# Patient Record
Sex: FEMALE | Race: WHITE | NOT HISPANIC OR LATINO | Employment: UNEMPLOYED | URBAN - METROPOLITAN AREA
[De-identification: names, ages, dates, MRNs, and addresses within clinical notes are randomized per-mention and may not be internally consistent; named-entity substitution may affect disease eponyms.]

---

## 2023-03-21 ENCOUNTER — TELEPHONE (OUTPATIENT)
Dept: PRIMARY CARE | Facility: CLINIC | Age: 58
End: 2023-03-21
Payer: OTHER GOVERNMENT

## 2023-03-21 DIAGNOSIS — I10 HYPERTENSION, UNSPECIFIED TYPE: ICD-10-CM

## 2023-03-21 DIAGNOSIS — N39.0 RECURRENT UTI: Primary | ICD-10-CM

## 2023-03-21 RX ORDER — CONJUGATED ESTROGENS 0.62 MG/G
CREAM VAGINAL
COMMUNITY
Start: 2020-09-18 | End: 2023-03-22 | Stop reason: SDUPTHER

## 2023-03-21 RX ORDER — NIFEDIPINE 30 MG/1
1 TABLET, FILM COATED, EXTENDED RELEASE ORAL DAILY
COMMUNITY
Start: 2020-09-18 | End: 2023-03-22 | Stop reason: SDUPTHER

## 2023-03-21 RX ORDER — ESTRADIOL 1 MG/1
1 TABLET ORAL DAILY
COMMUNITY
Start: 2020-09-18 | End: 2023-03-22 | Stop reason: SDUPTHER

## 2023-03-21 NOTE — TELEPHONE ENCOUNTER
INITIAL ASSESSMENT:
Pt evaluated for d/c planning needs.  Reviewed chart and spoke with nurse and
pt.  Pt is alert and oriented.  Pt lives in house with spouse and was
independent with ADL's prior to admission.  Pt is an [employer]
.  He said he informed his employer that he is hospitalized.  Pt has
CPAP, and uses no other DME.  Pt has not had home health in the past.  Pt said
he is an independent  and does not have insurance.  Referral made to
[facility].  No other needs identified. Patient would like to switch from metformin to something else. She's been getting an upset stomach with it.    She also wants a refill on Nifedipine 30mg  estradiol 1mg and premarin 0.625mg[facility]

## 2023-03-22 DIAGNOSIS — J30.9 ALLERGIC RHINITIS, UNSPECIFIED SEASONALITY, UNSPECIFIED TRIGGER: Primary | ICD-10-CM

## 2023-03-22 PROBLEM — E66.9 DIABETES MELLITUS TYPE 2 IN OBESE: Status: ACTIVE | Noted: 2023-03-22

## 2023-03-22 PROBLEM — N39.0 RECURRENT UTI: Status: ACTIVE | Noted: 2023-03-22

## 2023-03-22 PROBLEM — I10 HTN (HYPERTENSION): Status: ACTIVE | Noted: 2023-03-22

## 2023-03-22 PROBLEM — E11.69 DIABETES MELLITUS TYPE 2 IN OBESE: Status: ACTIVE | Noted: 2023-03-22

## 2023-03-22 PROBLEM — R30.0 DYSURIA: Status: ACTIVE | Noted: 2023-03-22

## 2023-03-22 RX ORDER — ESTRADIOL 1 MG/1
1 TABLET ORAL DAILY
Qty: 90 TABLET | Refills: 1 | Status: SHIPPED | OUTPATIENT
Start: 2023-03-22 | End: 2023-10-06

## 2023-03-22 RX ORDER — CONJUGATED ESTROGENS 0.62 MG/G
CREAM VAGINAL
Qty: 30 G | Refills: 1 | Status: SHIPPED | OUTPATIENT
Start: 2023-03-22

## 2023-03-22 RX ORDER — CETIRIZINE HYDROCHLORIDE 10 MG/1
TABLET ORAL
Qty: 90 TABLET | Refills: 3 | Status: SHIPPED | OUTPATIENT
Start: 2023-03-22 | End: 2024-04-03

## 2023-03-22 RX ORDER — NIFEDIPINE 30 MG/1
30 TABLET, FILM COATED, EXTENDED RELEASE ORAL DAILY
Qty: 90 TABLET | Refills: 1 | Status: SHIPPED | OUTPATIENT
Start: 2023-03-22 | End: 2023-10-06

## 2023-03-22 RX ORDER — FLUTICASONE PROPIONATE 50 MCG
SPRAY, SUSPENSION (ML) NASAL
Qty: 16 G | Refills: 11 | Status: SHIPPED | OUTPATIENT
Start: 2023-03-22

## 2023-03-24 ENCOUNTER — TELEPHONE (OUTPATIENT)
Dept: PRIMARY CARE | Facility: CLINIC | Age: 58
End: 2023-03-24
Payer: OTHER GOVERNMENT

## 2023-03-24 DIAGNOSIS — I10 HYPERTENSION, UNSPECIFIED TYPE: Primary | ICD-10-CM

## 2023-03-24 RX ORDER — TRIAMTERENE AND HYDROCHLOROTHIAZIDE 75; 50 MG/1; MG/1
1 TABLET ORAL DAILY
COMMUNITY
Start: 2020-09-18 | End: 2023-03-26 | Stop reason: SDUPTHER

## 2023-03-26 RX ORDER — TRIAMTERENE AND HYDROCHLOROTHIAZIDE 75; 50 MG/1; MG/1
1 TABLET ORAL DAILY
Qty: 90 TABLET | Refills: 3 | Status: SHIPPED | OUTPATIENT
Start: 2023-03-26 | End: 2024-04-03

## 2023-04-02 PROBLEM — K21.9 CHRONIC GERD: Status: ACTIVE | Noted: 2023-04-02

## 2023-04-02 PROBLEM — R35.0 URINARY FREQUENCY: Status: ACTIVE | Noted: 2023-04-02

## 2023-04-02 PROBLEM — E78.2 ELEVATED CHOLESTEROL WITH ELEVATED TRIGLYCERIDES: Status: ACTIVE | Noted: 2023-04-02

## 2023-04-02 PROBLEM — E66.01 CLASS 2 SEVERE OBESITY WITH SERIOUS COMORBIDITY AND BODY MASS INDEX (BMI) OF 36.0 TO 36.9 IN ADULT (MULTI): Status: ACTIVE | Noted: 2023-04-02

## 2023-04-02 PROBLEM — Z78.0 POSTMENOPAUSAL: Status: ACTIVE | Noted: 2023-04-02

## 2023-04-02 PROBLEM — Z88.9 HISTORY OF SEASONAL ALLERGIES: Status: ACTIVE | Noted: 2023-04-02

## 2023-04-02 PROBLEM — E66.812 CLASS 2 SEVERE OBESITY WITH SERIOUS COMORBIDITY AND BODY MASS INDEX (BMI) OF 36.0 TO 36.9 IN ADULT: Status: ACTIVE | Noted: 2023-04-02

## 2023-04-02 PROBLEM — B37.31 YEAST VAGINITIS: Status: ACTIVE | Noted: 2023-04-02

## 2023-04-02 PROBLEM — H10.13 ALLERGIC CONJUNCTIVITIS OF BOTH EYES: Status: ACTIVE | Noted: 2023-04-02

## 2023-04-02 RX ORDER — METFORMIN HYDROCHLORIDE 500 MG/1
1 TABLET, EXTENDED RELEASE ORAL 2 TIMES DAILY
COMMUNITY
Start: 2021-11-03 | End: 2023-04-03 | Stop reason: SINTOL

## 2023-04-02 RX ORDER — SULFACETAMIDE SODIUM, SULFUR 100; 50 MG/G; MG/G
EMULSION TOPICAL
COMMUNITY

## 2023-04-02 RX ORDER — OMEPRAZOLE 40 MG/1
40 CAPSULE, DELAYED RELEASE ORAL DAILY
COMMUNITY
Start: 2023-03-03

## 2023-04-02 RX ORDER — TRETINOIN 0.5 MG/G
CREAM TOPICAL
COMMUNITY

## 2023-04-03 ENCOUNTER — TELEMEDICINE (OUTPATIENT)
Dept: PRIMARY CARE | Facility: CLINIC | Age: 58
End: 2023-04-03
Payer: OTHER GOVERNMENT

## 2023-04-03 DIAGNOSIS — E66.9 DIABETES MELLITUS TYPE 2 IN OBESE: Primary | ICD-10-CM

## 2023-04-03 DIAGNOSIS — E11.69 DIABETES MELLITUS TYPE 2 IN OBESE: Primary | ICD-10-CM

## 2023-04-03 PROCEDURE — 99214 OFFICE O/P EST MOD 30 MIN: CPT | Performed by: FAMILY MEDICINE

## 2023-04-03 RX ORDER — GLIMEPIRIDE 1 MG/1
1 TABLET ORAL
Qty: 90 TABLET | Refills: 0 | Status: SHIPPED | OUTPATIENT
Start: 2023-04-03 | End: 2023-08-09 | Stop reason: SINTOL

## 2023-04-03 NOTE — PROGRESS NOTES
Subjective   Patient ID: Candie Toure is a 57 y.o. female who presents for Diabetes.    Diabetes       Wondering about changing medications for diabetes.  She states she noticed on days that she forgot metformin she felt a lot better overall  She decided to stop her metformin about 2 weeks ago and has had much less digestive upset.  She has been working with a GI group in [place] for her GERD and feels things are well controlled there  She has not been on any mood other diabetic medications except for metformin  Last A1c 6.0-2/23    No other concerns  Review of Systems   All other systems reviewed and are negative.      Objective   There were no vitals taken for this visit.    Physical Exam  Constitutional:       Appearance: Normal appearance.   Pulmonary:      Effort: No respiratory distress.   Neurological:      General: No focal deficit present.      Mental Status: She is alert.   Psychiatric:         Mood and Affect: Mood normal.         Behavior: Behavior normal.         Thought Content: Thought content normal.         Assessment/Plan   Problem List Items Addressed This Visit          Endocrine/Metabolic    Diabetes mellitus type 2 in obese (CMS/HCC) - Primary    Relevant Medications    glimepiride (Amaryl) 1 mg tablet   We will stop metformin try glimepiride  Risks benefits and side effects discussed.  We discussed checking blood sugar at home if having any hypoglycemic episodes  We will keep next scheduled follow-up in 4 months with labs unless concerns sooner    Patient verbalized understanding of plan of care and all questions were answered.       Skip Mckeon, DO

## 2023-07-20 ENCOUNTER — TELEPHONE (OUTPATIENT)
Dept: PRIMARY CARE | Facility: CLINIC | Age: 58
End: 2023-07-20
Payer: OTHER GOVERNMENT

## 2023-07-20 NOTE — TELEPHONE ENCOUNTER
PT states she has poison ivy all over her legs that is creeping up, seems to be getting worse than better.  Asking if she can get a RX sent to [facility] in [place]

## 2023-08-09 ENCOUNTER — OFFICE VISIT (OUTPATIENT)
Dept: PRIMARY CARE | Facility: CLINIC | Age: 58
End: 2023-08-09
Payer: OTHER GOVERNMENT

## 2023-08-09 VITALS
SYSTOLIC BLOOD PRESSURE: 140 MMHG | DIASTOLIC BLOOD PRESSURE: 70 MMHG | TEMPERATURE: 98.3 F | BODY MASS INDEX: 36.72 KG/M2 | WEIGHT: 210.6 LBS

## 2023-08-09 DIAGNOSIS — E11.69 DIABETES MELLITUS TYPE 2 IN OBESE: ICD-10-CM

## 2023-08-09 DIAGNOSIS — M54.42 CHRONIC LEFT-SIDED LOW BACK PAIN WITH LEFT-SIDED SCIATICA: ICD-10-CM

## 2023-08-09 DIAGNOSIS — G89.29 CHRONIC LEFT-SIDED LOW BACK PAIN WITH LEFT-SIDED SCIATICA: ICD-10-CM

## 2023-08-09 DIAGNOSIS — E66.9 DIABETES MELLITUS TYPE 2 IN OBESE: ICD-10-CM

## 2023-08-09 DIAGNOSIS — N39.0 RECURRENT UTI: Primary | ICD-10-CM

## 2023-08-09 DIAGNOSIS — M72.2 PLANTAR FASCIITIS: ICD-10-CM

## 2023-08-09 LAB
POC APPEARANCE, URINE: CLEAR
POC BILIRUBIN, URINE: NEGATIVE
POC BLOOD, URINE: NEGATIVE
POC COLOR, URINE: YELLOW
POC GLUCOSE, URINE: NEGATIVE MG/DL
POC KETONES, URINE: NEGATIVE MG/DL
POC LEUKOCYTES, URINE: ABNORMAL
POC NITRITE,URINE: NEGATIVE
POC PH, URINE: 5 PH
POC PROTEIN, URINE: NEGATIVE MG/DL
POC SPECIFIC GRAVITY, URINE: <=1.005
POC UROBILINOGEN, URINE: 0.2 EU/DL

## 2023-08-09 PROCEDURE — 87086 URINE CULTURE/COLONY COUNT: CPT

## 2023-08-09 PROCEDURE — 1036F TOBACCO NON-USER: CPT | Performed by: FAMILY MEDICINE

## 2023-08-09 PROCEDURE — 87077 CULTURE AEROBIC IDENTIFY: CPT

## 2023-08-09 PROCEDURE — 3078F DIAST BP <80 MM HG: CPT | Performed by: FAMILY MEDICINE

## 2023-08-09 PROCEDURE — 3077F SYST BP >= 140 MM HG: CPT | Performed by: FAMILY MEDICINE

## 2023-08-09 PROCEDURE — 81003 URINALYSIS AUTO W/O SCOPE: CPT | Performed by: FAMILY MEDICINE

## 2023-08-09 PROCEDURE — 99214 OFFICE O/P EST MOD 30 MIN: CPT | Performed by: FAMILY MEDICINE

## 2023-08-09 PROCEDURE — 87186 SC STD MICRODIL/AGAR DIL: CPT

## 2023-08-09 RX ORDER — ASCORBIC ACID 250 MG
250 TABLET ORAL DAILY
COMMUNITY

## 2023-08-09 RX ORDER — NITROFURANTOIN 25; 75 MG/1; MG/1
100 CAPSULE ORAL 2 TIMES DAILY
Qty: 10 CAPSULE | Refills: 0 | Status: SHIPPED | OUTPATIENT
Start: 2023-08-09 | End: 2023-08-14

## 2023-08-09 ASSESSMENT — ENCOUNTER SYMPTOMS
FEVER: 0
NAUSEA: 0
FREQUENCY: 1
VOMITING: 0
CHILLS: 0
DYSURIA: 1
BACK PAIN: 0
HEMATURIA: 0

## 2023-08-09 ASSESSMENT — PATIENT HEALTH QUESTIONNAIRE - PHQ9
1. LITTLE INTEREST OR PLEASURE IN DOING THINGS: NOT AT ALL
2. FEELING DOWN, DEPRESSED OR HOPELESS: NOT AT ALL
SUM OF ALL RESPONSES TO PHQ9 QUESTIONS 1 AND 2: 0

## 2023-08-09 NOTE — PROGRESS NOTES
Subjective   Patient ID: Candie Toure is a 58 y.o. female who presents for Follow-up.    HPI   Feels she has UTI.  Symptoms have been present for over a week.  She did leave a sample with her urogyn but has not had the results in over 48 hours and is still having symptoms  No fevers, chills, vomiting  Got new GI-Dr Isis Parker -  told needed supplement of some vitamin but not sure what  Had scopes - taking ppi in am and h2 blcoker in pm     DM- didn't tolerate metformin and Glimepreide made her very sick to her stomach so stopped all meds a few months ago     Pain in bottom of right heel x few months   Worst first steps in am or after sitting for a while   No hx of similar   Has used some motrin     Also some pain in left low back - has had this intermittently in past   Radites down her leg to mid calf  Worse when sitting   No injury   No numbness, or weakness  No fevers, chills, night sweats, unexpected weight loss    Review of Systems   Constitutional:  Negative for activity change, appetite change, chills, fatigue and fever.   Respiratory:  Negative for shortness of breath.    Cardiovascular:  Negative for chest pain, palpitations and leg swelling.   Gastrointestinal:  Negative for nausea and vomiting.   Genitourinary:  Positive for dysuria, frequency, pelvic pain and urgency. Negative for hematuria.   Musculoskeletal:  Negative for back pain.       Objective   /70   Temp 36.8 °C (98.3 °F)   Wt 95.5 kg (210 lb 9.6 oz)   BMI 36.72 kg/m²     Physical Exam  Vitals and nursing note reviewed.   Constitutional:       Appearance: Normal appearance. She is normal weight.   HENT:      Head: Normocephalic and atraumatic.      Right Ear: External ear normal.      Left Ear: External ear normal.      Mouth/Throat:      Mouth: Mucous membranes are moist.   Eyes:      Conjunctiva/sclera: Conjunctivae normal.   Cardiovascular:      Rate and Rhythm: Normal rate and regular rhythm.      Heart sounds: Normal heart  sounds.   Pulmonary:      Effort: Pulmonary effort is normal.      Breath sounds: Normal breath sounds.   Musculoskeletal:         General: No swelling.      Cervical back: Normal range of motion and neck supple.      Thoracic back: Normal.      Lumbar back: No deformity or bony tenderness. Normal range of motion. Positive right straight leg raise test. Negative left straight leg raise test.      Right foot: Normal.      Left foot: Normal.   Skin:     General: Skin is warm and dry.      Capillary Refill: Capillary refill takes less than 2 seconds.   Neurological:      General: No focal deficit present.      Mental Status: She is alert. Mental status is at baseline.   Psychiatric:         Mood and Affect: Mood normal.         Behavior: Behavior normal.         Thought Content: Thought content normal.         Judgment: Judgment normal.         Assessment/Plan   1. Recurrent UTI  Treat as below.  Call if symptoms worsen or do not improve  Continue to follow with urogyn  - nitrofurantoin, macrocrystal-monohydrate, (Macrobid) 100 mg capsule; Take 1 capsule (100 mg) by mouth 2 times a day for 5 days.  Dispense: 10 capsule; Refill: 0  - POCT UA Automated manually resulted  - Urine Culture    2. Chronic left-sided low back pain with left-sided sciatica  We will start with Medrol Dosepak and PT.  Declines imaging today.  Will follow-up in 6 to 8 weeks  - Referral to Physical Therapy; Future    3. Plantar fasciitis  Discussed plantar fasciitis likely diagnosis.  Encouraged morning stretches along with wearing good supportive footwear as patient states she is barefoot most of the time    4. Diabetes mellitus type 2 in obese (CMS/HCC)  A1c slightly worsened at 6.1 but off all medications.  She does not wish to trial another medication right now we will recheck labs in 6 months unless concerns sooner  Same with triglycerides.  Will recheck in 6 months    5. Elevated wbc- recheck 2 weeks when uti resolved.     Patient verbalized  understanding of plan of care and all questions were answered.         Skip Mckeon, DO

## 2023-08-11 DIAGNOSIS — D72.9 ABNORMAL WHITE BLOOD CELL (WBC): Primary | ICD-10-CM

## 2023-08-11 DIAGNOSIS — E78.2 ELEVATED CHOLESTEROL WITH ELEVATED TRIGLYCERIDES: ICD-10-CM

## 2023-08-11 DIAGNOSIS — E11.69 DIABETES MELLITUS TYPE 2 IN OBESE: ICD-10-CM

## 2023-08-11 DIAGNOSIS — E66.01 CLASS 2 SEVERE OBESITY WITH SERIOUS COMORBIDITY AND BODY MASS INDEX (BMI) OF 36.0 TO 36.9 IN ADULT, UNSPECIFIED OBESITY TYPE (MULTI): ICD-10-CM

## 2023-08-11 DIAGNOSIS — E66.9 DIABETES MELLITUS TYPE 2 IN OBESE: ICD-10-CM

## 2023-08-11 ASSESSMENT — ENCOUNTER SYMPTOMS
PALPITATIONS: 0
APPETITE CHANGE: 0
SHORTNESS OF BREATH: 0
FATIGUE: 0
ACTIVITY CHANGE: 0

## 2023-08-12 LAB — URINE CULTURE: ABNORMAL

## 2023-10-05 DIAGNOSIS — N39.0 RECURRENT UTI: ICD-10-CM

## 2023-10-05 DIAGNOSIS — I10 HYPERTENSION, UNSPECIFIED TYPE: ICD-10-CM

## 2023-10-06 RX ORDER — ESTRADIOL 1 MG/1
1 TABLET ORAL DAILY
Qty: 90 TABLET | Refills: 3 | Status: SHIPPED | OUTPATIENT
Start: 2023-10-06

## 2023-10-06 RX ORDER — NIFEDIPINE 30 MG/1
30 TABLET, FILM COATED, EXTENDED RELEASE ORAL DAILY
Qty: 90 TABLET | Refills: 3 | Status: SHIPPED | OUTPATIENT
Start: 2023-10-06

## 2024-01-24 ENCOUNTER — TELEMEDICINE (OUTPATIENT)
Dept: PRIMARY CARE | Facility: CLINIC | Age: 59
End: 2024-01-24
Payer: OTHER GOVERNMENT

## 2024-01-24 DIAGNOSIS — E66.9 DIABETES MELLITUS TYPE 2 IN OBESE: Primary | ICD-10-CM

## 2024-01-24 DIAGNOSIS — E66.01 CLASS 2 SEVERE OBESITY WITH SERIOUS COMORBIDITY AND BODY MASS INDEX (BMI) OF 35.0 TO 35.9 IN ADULT, UNSPECIFIED OBESITY TYPE (MULTI): ICD-10-CM

## 2024-01-24 DIAGNOSIS — E11.69 DIABETES MELLITUS TYPE 2 IN OBESE: Primary | ICD-10-CM

## 2024-01-24 PROCEDURE — 99214 OFFICE O/P EST MOD 30 MIN: CPT | Performed by: FAMILY MEDICINE

## 2024-01-24 RX ORDER — TIRZEPATIDE 2.5 MG/.5ML
2.5 INJECTION, SOLUTION SUBCUTANEOUS
Qty: 2 ML | Refills: 0 | Status: SHIPPED | OUTPATIENT
Start: 2024-01-24 | End: 2024-02-26 | Stop reason: SDUPTHER

## 2024-01-24 NOTE — PATIENT INSTRUCTIONS
mounjaro therapy     Dose/Weekly  - If a dose is not tolerated, consider delaying further dose increases for 4 weeks. Please send a [employer] message if needed    Weeks 1 through 4-2.5 mg once weekly    Weeks 5 through 8-5 mg once weekly    Weeks 9 though 12- 7.5 mg once weekly    Weeks 13 through 16- 10 mg once weekly    Please visit [email or web address]    Labs due in 3 months prior to next appt    Please continue to work on healthy diet and exercise    Call with any concerns       May consider Trulicity if mounjaro not covered

## 2024-01-24 NOTE — PROGRESS NOTES
Subjective   Patient ID: Candie Toure is a 58 y.o. female who presents for Follow-up (Weight loss desired).    HPI   DM- didn't tolerate metformin and Glimepreide made her very sick to her stomach so stopped all meds 2023  Wonder about medications she  has been lloyd about for weight loss and DM as this is something she is struggled with  She tries eat healthy and stay active  No other weight loss medications in the past  Planning for A1c in the next few weeks with follow-up visit  She has no personal or family history of M EN 2 or medullary thyroid cancer    Review of Systems  As noted HPI  Objective   There were no vitals taken for this visit.    Physical Exam  Constitutional:       Appearance: Normal appearance. She is obese.   Neurological:      General: No focal deficit present.      Mental Status: She is alert.   Psychiatric:         Mood and Affect: Mood normal.         Behavior: Behavior normal.         Thought Content: Thought content normal.         Judgment: Judgment normal.         Assessment/Plan   1. Diabetes mellitus type 2 in obese (CMS/HCC)  Patient has not tolerated metformin and glimepiride in the past due to GI side effects  She is interested in trialing Mounjaro  Risks, benefits ,side effects and alternatives of medication discussed at length  Patient has no contraindications to use  Discussed starting dose and titration  Get labs today and plan for follow-up in 12 weeks after starting medication  Patient verbalized understanding of plan of care and all questions were answered.     - tirzepatide (Mounjaro) 2.5 mg/0.5 mL pen injector; Inject 2.5 mg under the skin 1 (one) time per week.  Dispense: 2 mL; Refill: 0    2. Class 2 severe obesity with serious comorbidity and body mass index (BMI) of 35.0 to 35.9 in adult, unspecified obesity type (CMS/HCC)  As above      Skip Mckeon DO

## 2024-01-25 ENCOUNTER — TELEPHONE (OUTPATIENT)
Dept: PRIMARY CARE | Facility: CLINIC | Age: 59
End: 2024-01-25
Payer: OTHER GOVERNMENT

## 2024-01-25 NOTE — TELEPHONE ENCOUNTER
PHARMACY CALLING TO STATED THAT WITH PT'S  INSURANCE, BENEFICIARIES CAN HAVE A LOWER CO-PAY WITH TRULICTY THAN WITH  THE PRESCRIBED MOUNJARO.    PLEASE REVIEW AND ADVISE IF NEED TO COMPLETE THE PRIOR AUTHORIZATION FOR THE MOUNJARO.    WILL NEED A NEW PRESCRIPTION SENT TO [facility] IF CHANGING

## 2024-02-14 ENCOUNTER — APPOINTMENT (OUTPATIENT)
Dept: PRIMARY CARE | Facility: CLINIC | Age: 59
End: 2024-02-14
Payer: OTHER GOVERNMENT

## 2024-02-26 ENCOUNTER — TELEPHONE (OUTPATIENT)
Dept: PRIMARY CARE | Facility: CLINIC | Age: 59
End: 2024-02-26
Payer: OTHER GOVERNMENT

## 2024-02-26 DIAGNOSIS — E66.9 DIABETES MELLITUS TYPE 2 IN OBESE: ICD-10-CM

## 2024-02-26 DIAGNOSIS — E11.69 DIABETES MELLITUS TYPE 2 IN OBESE: ICD-10-CM

## 2024-02-26 RX ORDER — TIRZEPATIDE 2.5 MG/.5ML
2.5 INJECTION, SOLUTION SUBCUTANEOUS
Qty: 2 ML | Refills: 0 | Status: SHIPPED | OUTPATIENT
Start: 2024-02-26 | End: 2024-03-04 | Stop reason: ALTCHOICE

## 2024-03-04 DIAGNOSIS — E11.69 DIABETES MELLITUS TYPE 2 IN OBESE: Primary | ICD-10-CM

## 2024-03-04 DIAGNOSIS — E66.9 DIABETES MELLITUS TYPE 2 IN OBESE: Primary | ICD-10-CM

## 2024-03-04 RX ORDER — TIRZEPATIDE 5 MG/.5ML
5 INJECTION, SOLUTION SUBCUTANEOUS
Qty: 2 ML | Refills: 0 | Status: SHIPPED | OUTPATIENT
Start: 2024-03-04 | End: 2024-03-29

## 2024-03-29 DIAGNOSIS — E66.9 DIABETES MELLITUS TYPE 2 IN OBESE: ICD-10-CM

## 2024-03-29 DIAGNOSIS — E11.69 DIABETES MELLITUS TYPE 2 IN OBESE: ICD-10-CM

## 2024-03-29 RX ORDER — TIRZEPATIDE 5 MG/.5ML
INJECTION, SOLUTION SUBCUTANEOUS
Qty: 2 ML | Refills: 0 | Status: SHIPPED | OUTPATIENT
Start: 2024-03-29 | End: 2024-05-02 | Stop reason: SDUPTHER

## 2024-04-03 DIAGNOSIS — I10 HYPERTENSION, UNSPECIFIED TYPE: ICD-10-CM

## 2024-04-03 DIAGNOSIS — J30.9 ALLERGIC RHINITIS, UNSPECIFIED SEASONALITY, UNSPECIFIED TRIGGER: ICD-10-CM

## 2024-04-03 RX ORDER — CETIRIZINE HYDROCHLORIDE 10 MG/1
TABLET ORAL
Qty: 90 TABLET | Refills: 3 | Status: SHIPPED | OUTPATIENT
Start: 2024-04-03

## 2024-04-03 RX ORDER — TRIAMTERENE AND HYDROCHLOROTHIAZIDE 75; 50 MG/1; MG/1
1 TABLET ORAL DAILY
Qty: 90 TABLET | Refills: 3 | Status: SHIPPED | OUTPATIENT
Start: 2024-04-03

## 2024-05-02 DIAGNOSIS — E66.01 CLASS 2 SEVERE OBESITY WITH SERIOUS COMORBIDITY AND BODY MASS INDEX (BMI) OF 36.0 TO 36.9 IN ADULT, UNSPECIFIED OBESITY TYPE (MULTI): Primary | ICD-10-CM

## 2024-05-02 RX ORDER — TIRZEPATIDE 5 MG/.5ML
0.5 INJECTION, SOLUTION SUBCUTANEOUS
Qty: 2 ML | Refills: 1 | Status: SHIPPED | OUTPATIENT
Start: 2024-05-05 | End: 2024-05-06

## 2024-05-03 DIAGNOSIS — E66.01 CLASS 2 SEVERE OBESITY WITH SERIOUS COMORBIDITY AND BODY MASS INDEX (BMI) OF 36.0 TO 36.9 IN ADULT, UNSPECIFIED OBESITY TYPE (MULTI): ICD-10-CM

## 2024-05-06 DIAGNOSIS — I10 HYPERTENSION, UNSPECIFIED TYPE: ICD-10-CM

## 2024-05-06 DIAGNOSIS — R73.03 PREDIABETES: Primary | ICD-10-CM

## 2024-05-06 RX ORDER — TIRZEPATIDE 5 MG/.5ML
5 INJECTION, SOLUTION SUBCUTANEOUS
Qty: 6 ML | Refills: 1 | Status: SHIPPED | OUTPATIENT
Start: 2024-05-06 | End: 2024-05-15 | Stop reason: ALTCHOICE

## 2024-05-08 PROBLEM — R73.03 PREDIABETES: Status: ACTIVE | Noted: 2024-05-08

## 2024-05-15 ENCOUNTER — OFFICE VISIT (OUTPATIENT)
Dept: PRIMARY CARE | Facility: CLINIC | Age: 59
End: 2024-05-15
Payer: OTHER GOVERNMENT

## 2024-05-15 ENCOUNTER — HOSPITAL ENCOUNTER (OUTPATIENT)
Dept: RADIOLOGY | Facility: CLINIC | Age: 59
Discharge: HOME | End: 2024-05-15
Payer: OTHER GOVERNMENT

## 2024-05-15 VITALS
BODY MASS INDEX: 33.83 KG/M2 | DIASTOLIC BLOOD PRESSURE: 80 MMHG | TEMPERATURE: 97.9 F | WEIGHT: 194 LBS | SYSTOLIC BLOOD PRESSURE: 130 MMHG

## 2024-05-15 DIAGNOSIS — E66.01 CLASS 2 SEVERE OBESITY WITH SERIOUS COMORBIDITY AND BODY MASS INDEX (BMI) OF 35.0 TO 35.9 IN ADULT, UNSPECIFIED OBESITY TYPE (MULTI): Primary | ICD-10-CM

## 2024-05-15 DIAGNOSIS — G89.29 CHRONIC MIDLINE THORACIC BACK PAIN: ICD-10-CM

## 2024-05-15 DIAGNOSIS — M54.6 CHRONIC MIDLINE THORACIC BACK PAIN: ICD-10-CM

## 2024-05-15 DIAGNOSIS — E78.2 ELEVATED CHOLESTEROL WITH ELEVATED TRIGLYCERIDES: ICD-10-CM

## 2024-05-15 DIAGNOSIS — I10 PRIMARY HYPERTENSION: ICD-10-CM

## 2024-05-15 DIAGNOSIS — E66.9 TYPE 2 DIABETES MELLITUS WITH OBESITY (MULTI): ICD-10-CM

## 2024-05-15 DIAGNOSIS — E11.69 TYPE 2 DIABETES MELLITUS WITH OBESITY (MULTI): ICD-10-CM

## 2024-05-15 PROCEDURE — 3075F SYST BP GE 130 - 139MM HG: CPT | Performed by: FAMILY MEDICINE

## 2024-05-15 PROCEDURE — 72072 X-RAY EXAM THORAC SPINE 3VWS: CPT

## 2024-05-15 PROCEDURE — 3079F DIAST BP 80-89 MM HG: CPT | Performed by: FAMILY MEDICINE

## 2024-05-15 PROCEDURE — 99214 OFFICE O/P EST MOD 30 MIN: CPT | Performed by: FAMILY MEDICINE

## 2024-05-15 RX ORDER — TIRZEPATIDE 7.5 MG/.5ML
7.5 INJECTION, SOLUTION SUBCUTANEOUS
Qty: 2 ML | Refills: 0 | Status: SHIPPED | OUTPATIENT
Start: 2024-05-19 | End: 2024-05-15 | Stop reason: ALTCHOICE

## 2024-05-15 RX ORDER — TIRZEPATIDE 2.5 MG/.5ML
2.5 INJECTION, SOLUTION SUBCUTANEOUS
Qty: 2 ML | Refills: 1 | Status: SHIPPED | OUTPATIENT
Start: 2024-05-19 | End: 2024-05-15

## 2024-05-15 RX ORDER — TIRZEPATIDE 2.5 MG/.5ML
2.5 INJECTION, SOLUTION SUBCUTANEOUS
Qty: 2 ML | Refills: 1 | Status: SHIPPED | OUTPATIENT
Start: 2024-05-15

## 2024-05-15 ASSESSMENT — PATIENT HEALTH QUESTIONNAIRE - PHQ9
2. FEELING DOWN, DEPRESSED OR HOPELESS: NOT AT ALL
1. LITTLE INTEREST OR PLEASURE IN DOING THINGS: NOT AT ALL
SUM OF ALL RESPONSES TO PHQ9 QUESTIONS 1 AND 2: 0

## 2024-05-17 ENCOUNTER — TELEPHONE (OUTPATIENT)
Dept: PRIMARY CARE | Facility: CLINIC | Age: 59
End: 2024-05-17
Payer: OTHER GOVERNMENT

## 2024-05-17 DIAGNOSIS — M47.814 OSTEOARTHRITIS OF THORACIC SPINE, UNSPECIFIED SPINAL OSTEOARTHRITIS COMPLICATION STATUS: ICD-10-CM

## 2024-05-17 NOTE — TELEPHONE ENCOUNTER
----- Message from Skip Mckeon DO sent at 5/17/2024  6:36 AM EDT -----  Please call the patient regarding her abnormal result.  Let patient know she does have significant arthritis in her thoracic spine.  If she would like to look into physical therapy I think that would be reasonable.  Okay to place referral for physical therapy under diagnosis degenerative disc disease thoracic spine

## 2024-05-17 NOTE — TELEPHONE ENCOUNTER
Spoke with patient, she verbalized understanding. She will let us know where she is going to go for PT so I can send over order

## 2024-05-23 DIAGNOSIS — E11.69 TYPE 2 DIABETES MELLITUS WITH OBESITY (MULTI): Primary | ICD-10-CM

## 2024-05-23 DIAGNOSIS — E66.9 TYPE 2 DIABETES MELLITUS WITH OBESITY (MULTI): Primary | ICD-10-CM

## 2024-05-23 RX ORDER — TIRZEPATIDE 5 MG/.5ML
5 INJECTION, SOLUTION SUBCUTANEOUS
Qty: 6 ML | Refills: 1 | Status: SHIPPED | OUTPATIENT
Start: 2024-05-26 | End: 2024-11-10

## 2024-09-12 DIAGNOSIS — N39.0 RECURRENT UTI: ICD-10-CM

## 2024-09-12 DIAGNOSIS — I10 HYPERTENSION, UNSPECIFIED TYPE: ICD-10-CM

## 2024-09-13 RX ORDER — ESTRADIOL 1 MG/1
1 TABLET ORAL DAILY
Qty: 90 TABLET | Refills: 0 | Status: SHIPPED | OUTPATIENT
Start: 2024-09-13

## 2024-09-13 RX ORDER — NIFEDIPINE 30 MG/1
30 TABLET, EXTENDED RELEASE ORAL DAILY
Qty: 90 TABLET | Refills: 0 | Status: SHIPPED | OUTPATIENT
Start: 2024-09-13

## 2024-10-14 ENCOUNTER — APPOINTMENT (OUTPATIENT)
Dept: PRIMARY CARE | Facility: CLINIC | Age: 59
End: 2024-10-14
Payer: OTHER GOVERNMENT

## 2024-10-14 ENCOUNTER — LAB (OUTPATIENT)
Dept: LAB | Facility: LAB | Age: 59
End: 2024-10-14

## 2024-10-14 VITALS
HEIGHT: 63 IN | WEIGHT: 192 LBS | BODY MASS INDEX: 34.02 KG/M2 | DIASTOLIC BLOOD PRESSURE: 80 MMHG | TEMPERATURE: 97.8 F | SYSTOLIC BLOOD PRESSURE: 120 MMHG

## 2024-10-14 DIAGNOSIS — I10 HYPERTENSION, UNSPECIFIED TYPE: ICD-10-CM

## 2024-10-14 DIAGNOSIS — R73.03 PREDIABETES: ICD-10-CM

## 2024-10-14 DIAGNOSIS — Z12.39 ENCOUNTER FOR SCREENING BREAST EXAMINATION: ICD-10-CM

## 2024-10-14 DIAGNOSIS — I10 PRIMARY HYPERTENSION: ICD-10-CM

## 2024-10-14 DIAGNOSIS — E11.69 TYPE 2 DIABETES MELLITUS WITH OBESITY (MULTI): ICD-10-CM

## 2024-10-14 DIAGNOSIS — E66.812 CLASS 2 SEVERE OBESITY WITH SERIOUS COMORBIDITY AND BODY MASS INDEX (BMI) OF 35.0 TO 35.9 IN ADULT, UNSPECIFIED OBESITY TYPE: ICD-10-CM

## 2024-10-14 DIAGNOSIS — R73.03 PREDIABETES: Primary | ICD-10-CM

## 2024-10-14 DIAGNOSIS — E66.9 TYPE 2 DIABETES MELLITUS WITH OBESITY (MULTI): ICD-10-CM

## 2024-10-14 DIAGNOSIS — E66.01 CLASS 2 SEVERE OBESITY WITH SERIOUS COMORBIDITY AND BODY MASS INDEX (BMI) OF 35.0 TO 35.9 IN ADULT, UNSPECIFIED OBESITY TYPE: ICD-10-CM

## 2024-10-14 DIAGNOSIS — K21.9 CHRONIC GERD: ICD-10-CM

## 2024-10-14 DIAGNOSIS — Z23 NEED FOR INFLUENZA VACCINATION: ICD-10-CM

## 2024-10-14 LAB
ALBUMIN SERPL BCP-MCNC: 4.3 G/DL (ref 3.4–5)
ALP SERPL-CCNC: 76 U/L (ref 33–110)
ALT SERPL W P-5'-P-CCNC: 13 U/L (ref 7–45)
ANION GAP SERPL CALC-SCNC: 16 MMOL/L (ref 10–20)
AST SERPL W P-5'-P-CCNC: 17 U/L (ref 9–39)
BASOPHILS # BLD AUTO: 0.08 X10*3/UL (ref 0–0.1)
BASOPHILS NFR BLD AUTO: 0.9 %
BILIRUB SERPL-MCNC: 0.4 MG/DL (ref 0–1.2)
BUN SERPL-MCNC: 11 MG/DL (ref 6–23)
CALCIUM SERPL-MCNC: 9.5 MG/DL (ref 8.6–10.3)
CHLORIDE SERPL-SCNC: 99 MMOL/L (ref 98–107)
CHOLEST SERPL-MCNC: 179 MG/DL (ref 0–199)
CHOLESTEROL/HDL RATIO: 3.4
CO2 SERPL-SCNC: 28 MMOL/L (ref 21–32)
CREAT SERPL-MCNC: 0.77 MG/DL (ref 0.5–1.05)
EGFRCR SERPLBLD CKD-EPI 2021: 90 ML/MIN/1.73M*2
EOSINOPHIL # BLD AUTO: 0.08 X10*3/UL (ref 0–0.7)
EOSINOPHIL NFR BLD AUTO: 0.9 %
ERYTHROCYTE [DISTWIDTH] IN BLOOD BY AUTOMATED COUNT: 14.3 % (ref 11.5–14.5)
GLUCOSE SERPL-MCNC: 88 MG/DL (ref 74–99)
HCT VFR BLD AUTO: 41.9 % (ref 36–46)
HDLC SERPL-MCNC: 53.4 MG/DL
HGB BLD-MCNC: 13.5 G/DL (ref 12–16)
IMM GRANULOCYTES # BLD AUTO: 0.02 X10*3/UL (ref 0–0.7)
IMM GRANULOCYTES NFR BLD AUTO: 0.2 % (ref 0–0.9)
LDLC SERPL CALC-MCNC: 63 MG/DL
LYMPHOCYTES # BLD AUTO: 2.22 X10*3/UL (ref 1.2–4.8)
LYMPHOCYTES NFR BLD AUTO: 26.3 %
MCH RBC QN AUTO: 26.6 PG (ref 26–34)
MCHC RBC AUTO-ENTMCNC: 32.2 G/DL (ref 32–36)
MCV RBC AUTO: 83 FL (ref 80–100)
MONOCYTES # BLD AUTO: 0.72 X10*3/UL (ref 0.1–1)
MONOCYTES NFR BLD AUTO: 8.5 %
NEUTROPHILS # BLD AUTO: 5.33 X10*3/UL (ref 1.2–7.7)
NEUTROPHILS NFR BLD AUTO: 63.2 %
NON HDL CHOLESTEROL: 126 MG/DL (ref 0–149)
NRBC BLD-RTO: 0 /100 WBCS (ref 0–0)
PLATELET # BLD AUTO: 449 X10*3/UL (ref 150–450)
POTASSIUM SERPL-SCNC: 3.7 MMOL/L (ref 3.5–5.3)
PROT SERPL-MCNC: 7.9 G/DL (ref 6.4–8.2)
RBC # BLD AUTO: 5.08 X10*6/UL (ref 4–5.2)
SODIUM SERPL-SCNC: 139 MMOL/L (ref 136–145)
TRIGL SERPL-MCNC: 312 MG/DL (ref 0–149)
VLDL: 62 MG/DL (ref 0–40)
WBC # BLD AUTO: 8.5 X10*3/UL (ref 4.4–11.3)

## 2024-10-14 PROCEDURE — 1036F TOBACCO NON-USER: CPT | Performed by: FAMILY MEDICINE

## 2024-10-14 PROCEDURE — 85025 COMPLETE CBC W/AUTO DIFF WBC: CPT

## 2024-10-14 PROCEDURE — 83036 HEMOGLOBIN GLYCOSYLATED A1C: CPT

## 2024-10-14 PROCEDURE — 80053 COMPREHEN METABOLIC PANEL: CPT

## 2024-10-14 PROCEDURE — 36415 COLL VENOUS BLD VENIPUNCTURE: CPT

## 2024-10-14 PROCEDURE — 3008F BODY MASS INDEX DOCD: CPT | Performed by: FAMILY MEDICINE

## 2024-10-14 PROCEDURE — 90471 IMMUNIZATION ADMIN: CPT | Performed by: FAMILY MEDICINE

## 2024-10-14 PROCEDURE — 3079F DIAST BP 80-89 MM HG: CPT | Performed by: FAMILY MEDICINE

## 2024-10-14 PROCEDURE — 80061 LIPID PANEL: CPT

## 2024-10-14 PROCEDURE — 99214 OFFICE O/P EST MOD 30 MIN: CPT | Performed by: FAMILY MEDICINE

## 2024-10-14 PROCEDURE — 3074F SYST BP LT 130 MM HG: CPT | Performed by: FAMILY MEDICINE

## 2024-10-14 PROCEDURE — 90656 IIV3 VACC NO PRSV 0.5 ML IM: CPT | Performed by: FAMILY MEDICINE

## 2024-10-14 RX ORDER — TIRZEPATIDE 7.5 MG/.5ML
7.5 INJECTION, SOLUTION SUBCUTANEOUS WEEKLY
Qty: 2 ML | Refills: 0 | Status: SHIPPED | OUTPATIENT
Start: 2024-10-14

## 2024-10-14 RX ORDER — OMEPRAZOLE 40 MG/1
40 CAPSULE, DELAYED RELEASE ORAL
Qty: 90 CAPSULE | Refills: 3 | Status: SHIPPED | OUTPATIENT
Start: 2024-10-14 | End: 2025-10-14

## 2024-10-14 ASSESSMENT — PATIENT HEALTH QUESTIONNAIRE - PHQ9
1. LITTLE INTEREST OR PLEASURE IN DOING THINGS: NOT AT ALL
SUM OF ALL RESPONSES TO PHQ9 QUESTIONS 1 AND 2: 0
2. FEELING DOWN, DEPRESSED OR HOPELESS: NOT AT ALL

## 2024-10-14 NOTE — PROGRESS NOTES
"Subjective   Patient ID: Candie Toure is a 59 y.o. female who presents for Follow-up.    HPI   DM- didn't tolerate metformin and Glimepreide made her very sick to her stomach so stopped all meds 2023  On mounjaro since 2/24- up to 5 mg weekly   She is down about 25 pounds. Stalled out in wt loss in last few weeks  She tries eat healthy and stay active  No other weight loss medications in the past she has had some increased GERD the day after she takes her injection but states overall the omeprazole controls it    She still continues to have some pain in her left thoracic back area especially when she sleeps  She has not yet done physical therapy  No new pain or injury    Review of Systems  As noted HPI  Objective   /80   Temp 36.6 °C (97.8 °F)   Ht 1.6 m (5' 3\")   Wt 87.1 kg (192 lb)   BMI 34.01 kg/m²     Physical Exam  Vitals and nursing note reviewed.   Constitutional:       Appearance: Normal appearance. She is obese.   HENT:      Head: Normocephalic and atraumatic.      Right Ear: External ear normal.      Left Ear: External ear normal.      Mouth/Throat:      Mouth: Mucous membranes are moist.   Eyes:      Conjunctiva/sclera: Conjunctivae normal.   Cardiovascular:      Rate and Rhythm: Normal rate and regular rhythm.      Heart sounds: Normal heart sounds.   Pulmonary:      Effort: Pulmonary effort is normal.      Breath sounds: Normal breath sounds.   Musculoskeletal:         General: No swelling.      Cervical back: Normal, normal range of motion and neck supple.      Thoracic back: Spasms present. No tenderness or bony tenderness. Normal range of motion. No scoliosis.      Lumbar back: Normal.   Skin:     General: Skin is warm and dry.      Capillary Refill: Capillary refill takes less than 2 seconds.   Neurological:      General: No focal deficit present.      Mental Status: She is alert. Mental status is at baseline.   Psychiatric:         Mood and Affect: Mood normal.         Behavior: " Behavior normal.         Thought Content: Thought content normal.         Judgment: Judgment normal.         Assessment/Plan 1. Need for influenza vaccination  - Flu vaccine, trivalent, preservative free, age 6 months and greater (Fluarix/Fluzone/Flulaval)    2. Prediabetes  Weight is significantly down.  We will plan to check labs today.  Follow-up to be determined likely 6-month follow-up  - Hemoglobin A1C; Future  - tirzepatide (Mounjaro) 7.5 mg/0.5 mL pen injector; Inject 7.5 mg under the skin 1 (one) time per week.  Dispense: 2 mL; Refill: 0    3. Primary hypertension  Controlled. Continue current medicines/regimen.   - Comprehensive Metabolic Panel; Future  - Lipid Panel; Future  - tirzepatide (Mounjaro) 7.5 mg/0.5 mL pen injector; Inject 7.5 mg under the skin 1 (one) time per week.  Dispense: 2 mL; Refill: 0    4. Type 2 diabetes mellitus with obesity (Multi)  As above  - tirzepatide (Mounjaro) 7.5 mg/0.5 mL pen injector; Inject 7.5 mg under the skin 1 (one) time per week.  Dispense: 2 mL; Refill: 0    5. Class 2 severe obesity with serious comorbidity and body mass index (BMI) of 35.0 to 35.9 in adult, unspecified obesity type  As above  - tirzepatide (Mounjaro) 7.5 mg/0.5 mL pen injector; Inject 7.5 mg under the skin 1 (one) time per week.  Dispense: 2 mL; Refill: 0    6. Chronic GERD  Controlled. Continue current medicines/regimen.   - omeprazole (PriLOSEC) 40 mg DR capsule; Take 1 capsule (40 mg) by mouth once daily in the morning. Take before meals. As directed  Dispense: 90 capsule; Refill: 3    7. Encounter for screening breast examination  - BI mammo bilateral screening tomosynthesis; Future    Skip Mckeon, DO

## 2024-10-15 LAB
EST. AVERAGE GLUCOSE BLD GHB EST-MCNC: 117 MG/DL
HBA1C MFR BLD: 5.7 %

## 2024-10-16 DIAGNOSIS — E11.69 TYPE 2 DIABETES MELLITUS WITH OBESITY (MULTI): Primary | ICD-10-CM

## 2024-10-16 DIAGNOSIS — E66.9 TYPE 2 DIABETES MELLITUS WITH OBESITY (MULTI): Primary | ICD-10-CM

## 2024-11-06 DIAGNOSIS — E11.69 TYPE 2 DIABETES MELLITUS WITH OBESITY (MULTI): ICD-10-CM

## 2024-11-06 DIAGNOSIS — E66.9 TYPE 2 DIABETES MELLITUS WITH OBESITY (MULTI): ICD-10-CM

## 2024-11-06 RX ORDER — TIRZEPATIDE 5 MG/.5ML
5 INJECTION, SOLUTION SUBCUTANEOUS
Qty: 6 ML | Refills: 1 | Status: SHIPPED | OUTPATIENT
Start: 2024-11-10 | End: 2025-04-27

## 2024-12-05 DIAGNOSIS — I10 HYPERTENSION, UNSPECIFIED TYPE: ICD-10-CM

## 2024-12-05 DIAGNOSIS — N39.0 RECURRENT UTI: ICD-10-CM

## 2024-12-06 RX ORDER — ESTRADIOL 1 MG/1
1 TABLET ORAL DAILY
Qty: 90 TABLET | Refills: 3 | Status: SHIPPED | OUTPATIENT
Start: 2024-12-06

## 2024-12-06 RX ORDER — NIFEDIPINE 30 MG/1
30 TABLET, EXTENDED RELEASE ORAL DAILY
Qty: 90 TABLET | Refills: 3 | Status: SHIPPED | OUTPATIENT
Start: 2024-12-06

## 2025-03-05 DIAGNOSIS — J30.9 ALLERGIC RHINITIS, UNSPECIFIED SEASONALITY, UNSPECIFIED TRIGGER: ICD-10-CM

## 2025-03-05 RX ORDER — CETIRIZINE HYDROCHLORIDE 10 MG/1
TABLET ORAL
Qty: 90 TABLET | Refills: 3 | Status: SHIPPED | OUTPATIENT
Start: 2025-03-05

## 2025-04-03 DIAGNOSIS — E66.9 TYPE 2 DIABETES MELLITUS WITH OBESITY (MULTI): ICD-10-CM

## 2025-04-03 DIAGNOSIS — E11.69 TYPE 2 DIABETES MELLITUS WITH OBESITY (MULTI): ICD-10-CM

## 2025-04-11 LAB
ALBUMIN SERPL-MCNC: 4.1 G/DL (ref 3.6–5.1)
ALP SERPL-CCNC: 69 U/L (ref 37–153)
ALT SERPL-CCNC: 13 U/L (ref 6–29)
ANION GAP SERPL CALCULATED.4IONS-SCNC: 12 MMOL/L (CALC) (ref 7–17)
AST SERPL-CCNC: 16 U/L (ref 10–35)
BILIRUB SERPL-MCNC: 0.3 MG/DL (ref 0.2–1.2)
BUN SERPL-MCNC: 12 MG/DL (ref 7–25)
CALCIUM SERPL-MCNC: 9.6 MG/DL (ref 8.6–10.4)
CHLORIDE SERPL-SCNC: 100 MMOL/L (ref 98–110)
CO2 SERPL-SCNC: 27 MMOL/L (ref 20–32)
CREAT SERPL-MCNC: 0.69 MG/DL (ref 0.5–1.03)
EGFRCR SERPLBLD CKD-EPI 2021: 101 ML/MIN/1.73M2
EST. AVERAGE GLUCOSE BLD GHB EST-MCNC: 120 MG/DL
EST. AVERAGE GLUCOSE BLD GHB EST-SCNC: 6.6 MMOL/L
GLUCOSE SERPL-MCNC: 92 MG/DL (ref 65–139)
HBA1C MFR BLD: 5.8 % OF TOTAL HGB
POTASSIUM SERPL-SCNC: 4.1 MMOL/L (ref 3.5–5.3)
PROT SERPL-MCNC: 7.6 G/DL (ref 6.1–8.1)
SODIUM SERPL-SCNC: 139 MMOL/L (ref 135–146)

## 2025-04-16 ENCOUNTER — APPOINTMENT (OUTPATIENT)
Dept: PRIMARY CARE | Facility: CLINIC | Age: 60
End: 2025-04-16

## 2025-04-16 DIAGNOSIS — E11.69 TYPE 2 DIABETES MELLITUS WITH OBESITY (MULTI): Primary | ICD-10-CM

## 2025-04-16 DIAGNOSIS — N39.0 RECURRENT UTI: ICD-10-CM

## 2025-04-16 DIAGNOSIS — E66.9 TYPE 2 DIABETES MELLITUS WITH OBESITY (MULTI): Primary | ICD-10-CM

## 2025-04-16 NOTE — PROGRESS NOTES
Virtual or Telephone Consent    An interactive audio and video telecommunication system which permits real time communications between the patient (at the originating site) and provider (at the distant site) was utilized to provide this telehealth service.   Verbal consent was requested and obtained from Candie Toure on this date, 04/16/25 for a telehealth visit and the patient's location was confirmed at the time of the visit.      Subjective   Patient ID: Candie Toure is a 59 y.o. female who presents for Follow-up.    HPI   DM- didn't tolerate metformin and Glimepreide made her very sick to her stomach so stopped all meds 2023  On mounjaro since 2/24- up to 5 mg weekly   She is down about 25 pounds.   She really doesn't like giving herself the shot and is wondering about jardiance or rybelsus as oral option    She tries eat healthy and stay active  No other weight loss medications in the past she has had some increased GERD the day after she takes her injection but states overall the omeprazole controls it      Review of Systems  As noted HPI  Objective   There were no vitals taken for this visit.    Physical Exam  Constitutional:       Appearance: Normal appearance.   Pulmonary:      Effort: Pulmonary effort is normal.   Neurological:      General: No focal deficit present.      Mental Status: She is alert.   Psychiatric:         Mood and Affect: Mood normal.         Behavior: Behavior normal.         Thought Content: Thought content normal.         Judgment: Judgment normal.         Assessment/Plan     1. Type 2 diabetes mellitus with obesity (Multi)  semaglutide (Rybelsus) 3 mg tablet      2. Recurrent UTI  estrogens, conjugated, (Premarin) vaginal cream        Reviewed recent labs.  Will attempt to get oral GLP-1 covered.  Discussed titration  Follow-up to be determined    Patient verbalized understanding of plan of care and all questions were answered.     Skip Mckeon, DO

## 2025-04-23 ENCOUNTER — TELEPHONE (OUTPATIENT)
Dept: PRIMARY CARE | Facility: CLINIC | Age: 60
End: 2025-04-23
Payer: OTHER GOVERNMENT

## 2025-04-23 DIAGNOSIS — R92.8 ABNORMAL MAMMOGRAM OF RIGHT BREAST: Primary | ICD-10-CM

## 2025-04-23 NOTE — TELEPHONE ENCOUNTER
Lvm for callback     Orders in for diagnostic mammogram on the right plus possible ultrasound.  If patient calls back please give her that message I will try to call back as well

## 2025-05-07 ENCOUNTER — TELEPHONE (OUTPATIENT)
Dept: PRIMARY CARE | Facility: CLINIC | Age: 60
End: 2025-05-07
Payer: OTHER GOVERNMENT

## 2025-05-07 DIAGNOSIS — E11.69 TYPE 2 DIABETES MELLITUS WITH OBESITY (MULTI): Primary | ICD-10-CM

## 2025-05-07 DIAGNOSIS — E66.9 TYPE 2 DIABETES MELLITUS WITH OBESITY (MULTI): Primary | ICD-10-CM

## 2025-05-16 DIAGNOSIS — I10 HYPERTENSION, UNSPECIFIED TYPE: ICD-10-CM

## 2025-05-16 RX ORDER — TRIAMTERENE AND HYDROCHLOROTHIAZIDE 75; 50 MG/1; MG/1
1 TABLET ORAL DAILY
Qty: 90 TABLET | Refills: 3 | Status: SHIPPED | OUTPATIENT
Start: 2025-05-16

## 2025-06-04 DIAGNOSIS — E11.69 TYPE 2 DIABETES MELLITUS WITH OBESITY (MULTI): ICD-10-CM

## 2025-06-04 DIAGNOSIS — E66.9 TYPE 2 DIABETES MELLITUS WITH OBESITY (MULTI): ICD-10-CM

## 2025-06-13 DIAGNOSIS — E11.69 TYPE 2 DIABETES MELLITUS WITH OBESITY (MULTI): Primary | ICD-10-CM

## 2025-06-13 DIAGNOSIS — E66.9 TYPE 2 DIABETES MELLITUS WITH OBESITY (MULTI): Primary | ICD-10-CM

## 2025-06-14 DIAGNOSIS — E66.9 TYPE 2 DIABETES MELLITUS WITH OBESITY (MULTI): ICD-10-CM

## 2025-06-14 DIAGNOSIS — E11.69 TYPE 2 DIABETES MELLITUS WITH OBESITY (MULTI): ICD-10-CM

## 2025-07-28 ENCOUNTER — TELEPHONE (OUTPATIENT)
Dept: PRIMARY CARE | Facility: CLINIC | Age: 60
End: 2025-07-28
Payer: OTHER GOVERNMENT

## 2025-07-28 NOTE — TELEPHONE ENCOUNTER
Patient called stating her mammogram wasn't covered by insurance. She states she called insurance and they  told her pcp needs to check the codes then resubmit the order. Please advise

## 2025-07-29 NOTE — TELEPHONE ENCOUNTER
It is likely that her most recent mammogram from June was not covered because it was a diagnostic mammogram as a follow-up to an abnormal screening mammogram.  Diagnostic mammograms are not covered to 100% by most insurance plans because they are no longer for screening purposes

## 2025-08-28 DIAGNOSIS — E66.9 TYPE 2 DIABETES MELLITUS WITH OBESITY (MULTI): ICD-10-CM

## 2025-08-28 DIAGNOSIS — E11.69 TYPE 2 DIABETES MELLITUS WITH OBESITY (MULTI): ICD-10-CM

## 2025-10-15 ENCOUNTER — APPOINTMENT (OUTPATIENT)
Dept: PRIMARY CARE | Facility: CLINIC | Age: 60
End: 2025-10-15
Payer: OTHER GOVERNMENT